# Patient Record
Sex: FEMALE | Race: WHITE | URBAN - METROPOLITAN AREA
[De-identification: names, ages, dates, MRNs, and addresses within clinical notes are randomized per-mention and may not be internally consistent; named-entity substitution may affect disease eponyms.]

---

## 2018-05-17 ENCOUNTER — HOSPITAL ENCOUNTER (OUTPATIENT)
Dept: OTHER | Age: 78
Discharge: HOME | End: 2018-05-31
Attending: NEUROLOGICAL SURGERY | Admitting: NEUROLOGICAL SURGERY

## 2018-05-17 ASSESSMENT — PAIN SCALES - QUEBEC BACK PAIN DISABILITY SCALE
SLEEP THROUGH THE NIGHT: 5
MAKE YOUR BED: 1
PUT ON SOCKS OR PANYHOSE: 1
LIFT AND CARRY A HEAVY SUITCASE: 5
WALK A FEW BLOCKS OR 300 TO 400M: 5
SIT IN A CHAIR FOR SEVERAL HOURS: 4
TOTAL SCORE: 60
QUEBEC CMS MODIFIER: CL
TURN OVER IN BED: 2
STAND UP FOR 20 TO 30 MINUTES: 4
CLIMB ONE FLIGHT OF STAIRS: 5
MOVE A CHAIR: 1
THROW A BALL: 0
GET OUT OF BED: 3
REACH UP TO HIGH SHELVES: 3
RUN ONE BLOCK OR 100M: 5
CARRY TWO BAGS OF GROCERIES: 3
TAKE FOOD OUT OF THE REFRIGERATOR: 0
RIDE IN A CAR: 2
BEND OVER TO CLEAN THE BATHTUB: 4
PULL OR PUSH HEAVY DOORS: 2
QUEBEC DISABILITY INDEX: 60-79%
WALK SEVERAL KILOMETERS  OR MILES: 5

## 2018-05-22 ENCOUNTER — HOSPITAL ENCOUNTER (OUTPATIENT)
Dept: PHYSICAL THERAPY | Age: 78
Discharge: HOME | End: 2018-05-23
Admitting: NEUROLOGICAL SURGERY

## 2018-05-23 NOTE — FLOWSHEET NOTE
Physical Therapy Aquatic Flow Sheet   Date:  2018    Patient Name:  Carly Steel    :  1940  Restrictions/Precautions: Position Activity Restriction  Other position/activity restrictions: low fall risk     Pertinent Medical History: Additional Pertinent Hx: pacemaker/defibrillator, CAD: at evaluation: pt was lying flat and suddenly c/o \"fading\" and feeling like she was going to lose consciouness.  Pt sat and it resolved and then happened again when tranferring to the chair.  Pt's pulse is steady from pacemaker, and BP was 110/58.  She reports this has happened in the past with lying flat on the floor.  She is due to see her cardiologist for recent concerns with LE edema and possible new pacemaker. Misael Medina is going to mention  these episodes at that time.       Medical/Treatment Diagnosis Information:  · Diagnosis: lumbar spondylolisthesis, stenosis, back pain with radiculopathy  · Treatment Diagnosis: back and LE pain; dec ROM; dec strength and endurance      Insurance/Certification information:  PT Insurance Information: Medicare  Physician Information:  Referring Practitioner: Dr. Dejesus Border of care signed (Y/N): Faxed at eval     Visit# / total visits:    Pain level: 4/10       G-Code (if applicable):      Date G-Code Applied:    PT G-Codes  Functional Assessment Tool Used: [place]  Score: 60 ( 60-79%)  Functional Limitation: Changing and maintaining body position  Changing and Maintaining Body Position Current Status (): At least 60 percent but less than 80 percent impaired, limited or restricted  Changing and Maintaining Body Position Goal Status ():  At least 40 percent but less than 60 percent impaired, limited or restricted     Progress Note: []  Yes  [x]  No  Next due by: Visit #10:      Subjective:  Having a good day for her    Objective:  Observation:  See eval  Test measurements:      Key  B= Belt DB= Dumbells T= Theratube   G=Gloves N= Noodles W= Weights   P=

## 2018-06-01 ENCOUNTER — HOSPITAL ENCOUNTER (OUTPATIENT)
Dept: OTHER | Age: 78
Discharge: HOME | End: 2018-06-30
Attending: NEUROLOGICAL SURGERY | Admitting: NEUROLOGICAL SURGERY

## 2018-06-07 ENCOUNTER — HOSPITAL ENCOUNTER (OUTPATIENT)
Dept: PHYSICAL THERAPY | Age: 78
Discharge: HOME | End: 2018-06-08
Admitting: NEUROLOGICAL SURGERY

## 2018-06-07 NOTE — FLOWSHEET NOTE
Belt DB= Dumbells T= Theratube   G=Gloves N= Noodles W= Weights   P= Paddles WW=Water Walker K= Kickboard        Transfers:  stairs      % Immersion: 75%            Ambulation:  laps ind Exercises UE:      Forwards 2 Shoulder Shrugs     Lateral 1 Shoulder circles     Marching    Scapular Retraction      Retro   Rolling      Cariocas  Push Downs      IR/ER      Punching    Stretching:  Rowing    Gastroc/Soleus   Elbow Flex/Ext    Hamstring   Shldr Flex/Ext     Knee flex stretch   Shldr Abd/Add    Piriformis   Horiz Abd/Add     Hip flexor    Arm Circles     SKTC   PNF Diagonals    DKTC  UE oscillations f/b, s/s    ITB   Wall Push-ups    Quad  Figure 8's    Mid back   Buoy pull/push downs    UT  Tband rows    Rhom  Tband lats    Post Shoulder  Lumbar Rot w/ paddles    Pec   Small ball pull downs                   diagonals             Cervical:       AROM Flex       AROM Extension     LEs exercises:   AROM Side Bending    Marching 10x AROM Rotation    Heel Raises 10x Chin tucks    Toe Raises 10x Chin nods     Squats 10x      Hamstring Curls 10x      Hip Flexion 10x Balance:      Hip Abduction 10x SLS    Hip Circles 10x Tandem stance    TA set 10x NBOS eyes open    Glut Set 10x NBOS eyes closed    Hip Extension  Hand to Opposite Knee    Hip Adduction   Box Step     Hip IR  Noodle Stance     Hip ER  Stop/Go Gait    Fig 8's  Switch Gait                Seated:  Functional:    Ankle Pumps 10x Step up forward    Ankle circles 10 Step up lateral    Knee flex & ext 10x Step down    Hip Abd & Adduction 10x Shallow Water squats    Bicycle  10x Crate Lifts    Add Set with ball 10x Lunges forward    LX stab with med ball throws  Lunges lateral    Ankle INV  Lunges retro    Ankle EV  Lower ab curl with noodle      Upper ab curl with ball      Med ball straight lifts      Med ball diagonal lifts      Hydrorider          Noodle:      Leg Press  Deep Water:    Noodle hang at wall  Jog    Noodle hang deep water  Jumping [employer]

## 2018-06-12 ENCOUNTER — HOSPITAL ENCOUNTER (OUTPATIENT)
Dept: PHYSICAL THERAPY | Age: 78
Discharge: HOME | End: 2018-06-13
Admitting: NEUROLOGICAL SURGERY

## 2018-06-12 NOTE — FLOWSHEET NOTE
LX stab with med ball throws  Lunges lateral    Ankle INV  Lunges retro    Ankle EV  Lower ab curl with noodle      Upper ab curl with ball      Med ball straight lifts      Med ball diagonal lifts      Hydrorider          Noodle:      Leg Press  Deep Water:    Noodle hang at wall  Jog    Noodle hang deep water  Jumping Jacks    Noodle Bicycle  Heel to toe      Hand to opposite knee      Cross country skier      Rocking Horse        Timed Code Treatment Minutes:  45  Total Treatment Minutes:  45    Treatment/Activity Tolerance:  [x] Patient tolerated treatment well [] Patient limited by fatique  [] Patient limited by pain  [] Patient limited by other medical complications  [x] Other:   6/13: discussed at length with patient diagnosis and potential causes of LE pain. Also discussed with pt possible neuropathy as a second source of foot pain. Patient instructed to talk with PCP or spine MD about possible neuropathy if symptoms don't improve with PT. Patient notified she can contact MD sooner about B foot pain if it continues to be miserable. Patient verbalized understanding. Prognosis: [x] Good [] Fair  [] Poor    Patient Requires Follow-up: [x] Yes  [] No    Plan:   [x] Continue per plan of care [] Alter current plan (see comments)  [] Plan of care initiated [] Hold pending MD visit [] Discharge    Plan for Next Session: increase gait.   Add long lever UEs and [facility].     Electronically signed by: Mick Scott, [id number] Jigar Gaffney

## 2018-06-14 ENCOUNTER — HOSPITAL ENCOUNTER (OUTPATIENT)
Dept: PHYSICAL THERAPY | Age: 78
Discharge: HOME | End: 2018-06-15
Admitting: NEUROLOGICAL SURGERY

## 2018-06-19 ENCOUNTER — HOSPITAL ENCOUNTER (OUTPATIENT)
Dept: PHYSICAL THERAPY | Age: 78
Discharge: HOME | End: 2018-06-20
Admitting: NEUROLOGICAL SURGERY

## 2018-06-19 NOTE — FLOWSHEET NOTE
Physical Therapy Aquatic Flow Sheet   Date:  2018    Patient Name:  Pretty Sahni    :  1940  Restrictions/Precautions: Position Activity Restriction  Other position/activity restrictions: low fall risk     Pertinent Medical History: Additional Pertinent Hx: pacemaker/defibrillator, CAD: at evaluation: pt was lying flat and suddenly c/o \"fading\" and feeling like she was going to lose consciouness.  Pt sat and it resolved and then happened again when tranferring to the chair.  Pt's pulse is steady from pacemaker, and BP was 110/58.  She reports this has happened in the past with lying flat on the floor.  She is due to see her cardiologist for recent concerns with LE edema and possible new pacemaker. Lana Walter is going to mention  these episodes at that time.       Medical/Treatment Diagnosis Information:  · Diagnosis: lumbar spondylolisthesis, stenosis, back pain with radiculopathy  · Treatment Diagnosis: back and LE pain; dec ROM; dec strength and endurance      Insurance/Certification information:  PT Insurance Information: Medicare  Physician Information:  Referring Practitioner: Dr. Nick Thomas of care signed (Y/N): Faxed at eval     Visit# / total visits:    Pain level: 2/10       G-Code (if applicable):      Date G-Code Applied:    PT G-Codes  Functional Assessment Tool Used: [place]  Score: 60 ( 60-79%)  Functional Limitation: Changing and maintaining body position  Changing and Maintaining Body Position Current Status (): At least 60 percent but less than 80 percent impaired, limited or restricted  Changing and Maintaining Body Position Goal Status (): At least 40 percent but less than 60 percent impaired, limited or restricted     Progress Note: []  Yes  [x]  No  Next due by: Visit #10:      Subjective:  No pain currently. States she still wakes at night due to B foot pain but has improved.      Objective:  Observation:  See eval  Test measurements:      Key  B= Belt DB= Dumbells T= Theratube   G=Gloves N= Noodles W= Weights   P= Paddles WW=Water Walker K= Kickboard        Transfers:  stairs      % Immersion: 75%            Ambulation:  laps ind Exercises UE:      Forwards 4 Shoulder Shrugs 10x    Lateral 2 Shoulder circles     Marching    Scapular Retraction 10x    Retro   Rolling 10x     Cariocas  Push Downs 10x     IR/ER 10x     Punching 10x   Stretching: bilateral Rowing 10x   Gastroc/Soleus 3x20 sec  Elbow Flex/Ext 10x   Hamstring   Shldr Flex/Ext 10x    Knee flex stretch   Shldr Abd/Add 10x   Piriformis   Horiz Abd/Add 10x    Hip flexor    Arm Circles 10x    SKTC   PNF Diagonals 10x   DKTC x1 min - PRN throughout treatment session UE oscillations f/b, s/s    ITB   Wall Push-ups    Quad  Figure 8's    Mid back   Buoy pull/push downs    UT  Tband rows    Rhom  Tband lats    Post Shoulder  Lumbar Rot w/ paddles    Pec   Small ball pull downs                   diagonals             Cervical:       AROM Flex       AROM Extension     LEs exercises:   AROM Side Bending    Marching 10x AROM Rotation    Heel Raises 10x Chin tucks    Toe Raises 10x Chin nods     Squats 10x      Hamstring Curls 10x      Hip Flexion 10x Balance:      Hip Abduction 10x SLS    Hip Circles 10x Tandem stance x30 sec L/R   TA set 10x NBOS eyes open    Glut Set 10x NBOS eyes closed    Hip Extension  Hand to Opposite Knee    Hip Adduction   Box Step     Hip IR  Noodle Stance     Hip ER  Stop/Go Gait    Fig 8's  Switch Gait                Seated:  Functional:    Ankle Pumps  held due to cramping in feet when performing Step up forward    Ankle circles  Step up lateral    Knee flex & ext 10x Step down    Hip Abd & Adduction 10x Shallow Water squats    Bicycle  10x Crate Lifts    Add Set with ball 10x Lunges forward    LX stab with med ball throws  Lunges lateral    Ankle INV  Lunges retro    Ankle EV  Lower ab curl with noodle      Upper ab curl with ball      Med ball straight lifts      Med ball diagonal

## 2018-06-21 ENCOUNTER — HOSPITAL ENCOUNTER (OUTPATIENT)
Dept: PHYSICAL THERAPY | Age: 78
Discharge: HOME | End: 2018-06-22
Admitting: NEUROLOGICAL SURGERY

## 2018-06-21 NOTE — FLOWSHEET NOTE
Physical Therapy Aquatic Flow Sheet   Date:  2018    Patient Name:  Darius Vasquez    :  1940  Restrictions/Precautions: Position Activity Restriction  Other position/activity restrictions: low fall risk     Pertinent Medical History: Additional Pertinent Hx: pacemaker/defibrillator, CAD: at evaluation: pt was lying flat and suddenly c/o \"fading\" and feeling like she was going to lose consciouness.  Pt sat and it resolved and then happened again when tranferring to the chair.  Pt's pulse is steady from pacemaker, and BP was 110/58.  She reports this has happened in the past with lying flat on the floor.  She is due to see her cardiologist for recent concerns with LE edema and possible new pacemaker. Chasity Rosario is going to mention  these episodes at that time.       Medical/Treatment Diagnosis Information:  · Diagnosis: lumbar spondylolisthesis, stenosis, back pain with radiculopathy  · Treatment Diagnosis: back and LE pain; dec ROM; dec strength and endurance      Insurance/Certification information:  PT Insurance Information: Medicare  Physician Information:  Referring Practitioner: Dr. Alexis Holder of care signed (Y/N): Faxed at eval     Visit# / total visits:    Pain level: 3/10       G-Code (if applicable):      Date G-Code Applied:    PT G-Codes  Functional Assessment Tool Used: [place]  Score: 60 ( 60-79%)  Functional Limitation: Changing and maintaining body position  Changing and Maintaining Body Position Current Status (): At least 60 percent but less than 80 percent impaired, limited or restricted  Changing and Maintaining Body Position Goal Status (): At least 40 percent but less than 60 percent impaired, limited or restricted     Progress Note: []  Yes  [x]  No  Next due by: Visit #10:      Subjective:  Patient states she continues to have good days and bad days. Notes her severe pain is no longer constant since starting PT. Patient reports 50% improvement overall. However does continue to complain of severe B foot pain especially at night which keeps her awake. Last night she was awake for 1.5 hours due to pain. Has been limiting how long she is up on her feet at one time which seems to help. Objective:  Observation:  See eval  Test measurements:      Key  B= Belt DB= Dumbells T= Theratube   G=Gloves N= Noodles W= Weights   P= Paddles WW=Water Walker K= Kickboard        Transfers:  stairs      % Immersion: 75%            Ambulation:  laps ind Exercises UE:      Forwards 4 Shoulder Shrugs 10x    Lateral 2 Shoulder circles     Marching    Scapular Retraction 10x    Retro   Rolling 10x     Cariocas  Push Downs 10x     IR/ER 10x     Punching 10x   Stretching: bilateral Rowing 10x   Gastroc/Soleus 3x20 sec  Elbow Flex/Ext 10x   Hamstring   Shldr Flex/Ext 10x    Knee flex stretch   Shldr Abd/Add 10x   Piriformis   Horiz Abd/Add 10x    Hip flexor    Arm Circles 10x    SKTC   PNF Diagonals 10x   DKTC x1 min - PRN throughout treatment session UE oscillations f/b, s/s    ITB   Wall Push-ups    Quad  Figure 8's    Mid back   Buoy pull/push downs    UT  Tband rows    Rhom  Tband lats    Post Shoulder  Lumbar Rot w/ paddles    Pec   Small ball pull downs                   diagonals             Cervical:       AROM Flex       AROM Extension     LEs exercises:   AROM Side Bending    Marching 10x AROM Rotation    Heel Raises 10x Chin tucks    Toe Raises 10x Chin nods     Squats 10x      Hamstring Curls 10x      Hip Flexion 10x Balance:      Hip Abduction 10x SLS    Hip Circles 10x Tandem stance x30 sec L/R   TA set 10x NBOS eyes open    Glut Set 10x NBOS eyes closed    Hip Extension  Hand to Opposite Knee    Hip Adduction   Box Step     Hip IR  Noodle Stance     Hip ER  Stop/Go Gait    Fig 8's  Switch Gait                Seated:  Functional:    Ankle Pumps  held due to cramping in feet when performing Step up forward 10x   Ankle circles  Step up lateral    Knee flex & ext 15x Step down Hip Abd & Adduction 15x Shallow Water squats    Bicycle  15x Crate Lifts    Add Set with ball 10x Lunges forward    LX stab with med ball throws  Lunges lateral    Ankle INV  Lunges retro    Ankle EV  Lower ab curl with noodle      Upper ab curl with ball      Med ball straight lifts      Med ball diagonal lifts      Hydrorider          Noodle:      Leg Press Small 10x L/R Deep Water:    Noodle hang at wall  held due to increased foot pain last session Jog    Noodle hang deep water  Jumping Jacks    Noodle Bicycle  Heel to toe      Hand to opposite knee      Cross country skier      Rocking Horse        Timed Code Treatment Minutes:  60  Total Treatment Minutes:  60    Treatment/Activity Tolerance:  [x] Patient tolerated treatment well [] Patient limited by fatique  [] Patient limited by pain  [] Patient limited by other medical complications  [] Other:      Prognosis: [x] Good [] Fair  [] Poor    Patient Requires Follow-up: [x] Yes  [] No    Plan:   [x] Continue per plan of care [] Alter current plan (see comments)  [] Plan of care initiated [] Hold pending MD visit [] Discharge    Plan for Next Session: add lateral step ups    Electronically signed by: Macy Hercules, [id number] Jigar Gaffney

## 2018-06-26 ENCOUNTER — HOSPITAL ENCOUNTER (OUTPATIENT)
Dept: PHYSICAL THERAPY | Age: 78
Discharge: HOME | End: 2018-06-27
Admitting: NEUROLOGICAL SURGERY

## 2018-06-26 NOTE — FLOWSHEET NOTE
Physical Therapy Aquatic Flow Sheet   Date:  2018    Patient Name:  Mateo Key    :  1940  Restrictions/Precautions: Position Activity Restriction  Other position/activity restrictions: low fall risk     Pertinent Medical History: Additional Pertinent Hx: pacemaker/defibrillator, CAD: at evaluation: pt was lying flat and suddenly c/o \"fading\" and feeling like she was going to lose consciouness.  Pt sat and it resolved and then happened again when tranferring to the chair.  Pt's pulse is steady from pacemaker, and BP was 110/58.  She reports this has happened in the past with lying flat on the floor.  She is due to see her cardiologist for recent concerns with LE edema and possible new pacemaker. Keely Cohen is going to mention  these episodes at that time.       Medical/Treatment Diagnosis Information:  · Diagnosis: lumbar spondylolisthesis, stenosis, back pain with radiculopathy  · Treatment Diagnosis: back and LE pain; dec ROM; dec strength and endurance      Insurance/Certification information:  PT Insurance Information: Medicare  Physician Information:  Referring Practitioner: Dr. Sissy Rodríguez of care signed (Y/N): Faxed at eval     Visit# / total visits:    Pain level: 2/10       G-Code (if applicable):      Date G-Code Applied:    PT G-Codes  Functional Assessment Tool Used: [place]  Score: 60 ( 60-79%)  Functional Limitation: Changing and maintaining body position  Changing and Maintaining Body Position Current Status (): At least 60 percent but less than 80 percent impaired, limited or restricted  Changing and Maintaining Body Position Goal Status (): At least 40 percent but less than 60 percent impaired, limited or restricted     Progress Note: []  Yes  [x]  No  Next due by: Visit #10:      Subjective:  Feels aquatic ex helpful.      Objective:  Observation:  See eval  Test measurements:      Key  B= Belt DB= Dumbells T= Theratube   G=Gloves N= Noodles W= Weights   P= Paddles WW=Water Walker K= Kickboard        Transfers:  stairs      % Immersion: 75%            Ambulation:  laps ind Exercises UE:      Forwards 4 Shoulder Shrugs 10x    Lateral 2 Shoulder circles     Marching    Scapular Retraction 10x    Retro   Rolling 10x     Cariocas  Push Downs 10x     IR/ER 10x     Punching 10x   Stretching: bilateral Rowing 10x   Gastroc/Soleus 3x20 sec  Elbow Flex/Ext 10x   Hamstring  3 x 20 sec Shldr Flex/Ext 10x    Knee flex stretch   Shldr Abd/Add 10x   Piriformis   Horiz Abd/Add 10x    Hip flexor    Arm Circles 10x    SKTC   PNF Diagonals 10x   DKTC x1 min - PRN throughout treatment session UE oscillations f/b, s/s    ITB   Wall Push-ups    Quad  Figure 8's    Mid back   Buoy pull/push downs    UT  Tband rows    Rhom  Tband lats    Post Shoulder  Lumbar Rot w/ paddles    Pec   Small ball pull downs                   diagonals             Cervical:       AROM Flex       AROM Extension     LEs exercises:   AROM Side Bending    Marching 10x AROM Rotation    Heel Raises 10x Chin tucks    Toe Raises 10x Chin nods     Squats 10x      Hamstring Curls 10x      Hip Flexion 10x Balance:      Hip Abduction 10x SLS    Hip Circles 10x Tandem stance x30 sec L/R   TA set 10x NBOS eyes open    Glut Set 10x NBOS eyes closed    Hip Extension  Hand to Opposite Knee    Hip Adduction   Box Step     Hip IR  Noodle Stance     Hip ER  Stop/Go Gait    Fig 8's  Switch Gait                Seated:  Functional:    Ankle Pumps  held due to cramping in feet when performing Step up forward 10x   Ankle circles  Step up lateral 10x   Knee flex & ext 15x Step down    Hip Abd & Adduction 15x Shallow Water squats    Bicycle  15x Crate Lifts    Add Set with ball 10x Lunges forward    LX stab with med ball throws  Lunges lateral    Ankle INV  Lunges retro    Ankle EV  Lower ab curl with noodle      Upper ab curl with ball      Med ball straight lifts      Med ball diagonal lifts      Hydrorider          Noodle:      Leg Press Small 10x L/R Deep Water:    Noodle hang at wall  held due to increased foot pain in past Jog    Noodle hang deep water  Jumping Jacks    Noodle Bicycle  Heel to toe      Hand to opposite knee      Cross country skier      Rocking Horse        Timed Code Treatment Minutes:  60  Total Treatment Minutes:  60    Treatment/Activity Tolerance:  [x] Patient tolerated treatment well [] Patient limited by fatique  [] Patient limited by pain  [] Patient limited by other medical complications  [x] Other:  C/o of increased B foot pain after seated ex. Needs cues to stay focused and perform ex correctly    Prognosis: [x] Good [] Fair  [] Poor    Patient Requires Follow-up: [x] Yes  [] No    Plan:   [x] Continue per plan of care [] Alter current plan (see comments)  [] Plan of care initiated [] Hold pending MD visit [] Discharge    Plan for Next Session: hand to opposite knee. Cont aquatics 2x/week x 4 weeks.      Electronically signed by: Marva Han,

## 2018-06-28 ENCOUNTER — HOSPITAL ENCOUNTER (OUTPATIENT)
Dept: PHYSICAL THERAPY | Age: 78
Discharge: HOME | End: 2018-06-29
Admitting: NEUROLOGICAL SURGERY

## 2018-06-28 NOTE — FLOWSHEET NOTE
ab curl with ball      Med ball straight lifts      Med ball diagonal lifts      Hydrorider          Noodle:      Leg Press Small 10x L/R Deep Water:    Noodle hang at wall  held due to increased foot pain in past Jog    Noodle hang deep water  Jumping Jacks    Noodle Bicycle  Heel to toe      Hand to opposite knee      Cross country skier      Rocking Horse        Timed Code Treatment Minutes:  50  Total Treatment Minutes:  50    Treatment/Activity Tolerance:  [x] Patient tolerated treatment well [] Patient limited by fatique  [] Patient limited by pain  [] Patient limited by other medical complications  [x] Other:  Offered patient more hang time in between ex today. Cues for proper ex tech . Pain 2/10 after ex    Prognosis: [x] Good [] Fair  [] Poor    Patient Requires Follow-up: [x] Yes  [] No    Plan:   [x] Continue per plan of care [] Alter current plan (see comments)  [] Plan of care initiated [] Hold pending MD visit [] Discharge    Plan for Next Session: hand to opposite knee.     Electronically signed by: George Cervantes,

## 2018-07-01 ENCOUNTER — HOSPITAL ENCOUNTER (OUTPATIENT)
Dept: OTHER | Age: 78
Discharge: HOME | End: 2018-07-20
Attending: NEUROLOGICAL SURGERY | Admitting: NEUROLOGICAL SURGERY

## 2018-07-03 ENCOUNTER — HOSPITAL ENCOUNTER (OUTPATIENT)
Dept: PHYSICAL THERAPY | Age: 78
Discharge: HOME | End: 2018-07-04
Admitting: NEUROLOGICAL SURGERY

## 2018-07-03 ASSESSMENT — PAIN SCALES - QUEBEC BACK PAIN DISABILITY SCALE
BEND OVER TO CLEAN THE BATHTUB: 4
QUEBEC CMS MODIFIER: CK
WALK SEVERAL KILOMETERS  OR MILES: 5
TAKE FOOD OUT OF THE REFRIGERATOR: 0
RUN ONE BLOCK OR 100M: 5
WALK A FEW BLOCKS OR 300 TO 400M: 5
QUEBEC DISABILITY INDEX: 40-59%
TURN OVER IN BED: 2
MOVE A CHAIR: 1
PUT ON SOCKS OR PANYHOSE: 1
TOTAL SCORE: 57
PULL OR PUSH HEAVY DOORS: 2
SIT IN A CHAIR FOR SEVERAL HOURS: 4
REACH UP TO HIGH SHELVES: 3
SLEEP THROUGH THE NIGHT: 5
STAND UP FOR 20 TO 30 MINUTES: 2
GET OUT OF BED: 3
CLIMB ONE FLIGHT OF STAIRS: 4
THROW A BALL: 0
MAKE YOUR BED: 1
RIDE IN A CAR: 2
LIFT AND CARRY A HEAVY SUITCASE: 5
CARRY TWO BAGS OF GROCERIES: 3

## 2018-07-03 NOTE — FLOWSHEET NOTE
Physical Therapy Aquatic Flow Sheet   Date:  7/3/2018    Patient Name:  Jaqueline Miller    :  1940  Restrictions/Precautions: Position Activity Restriction  Other position/activity restrictions: low fall risk     Pertinent Medical History: Additional Pertinent Hx: pacemaker/defibrillator, CAD: at evaluation: pt was lying flat and suddenly c/o \"fading\" and feeling like she was going to lose consciouness.  Pt sat and it resolved and then happened again when tranferring to the chair.  Pt's pulse is steady from pacemaker, and BP was 110/58.  She reports this has happened in the past with lying flat on the floor.  She is due to see her cardiologist for recent concerns with LE edema and possible new pacemaker. Denny Langfordanna is going to mention  these episodes at that time.       Medical/Treatment Diagnosis Information:  · Diagnosis: lumbar spondylolisthesis, stenosis, back pain with radiculopathy  · Treatment Diagnosis: back and LE pain; dec ROM; dec strength and endurance      Insurance/Certification information:  PT Insurance Information: Medicare  Physician Information:  Referring Practitioner: Dr. Kana Marrero of care signed (Y/N): Faxed at eval     Visit# / total visits:  10/ 20  Pain level: 4.5/10       G-Code (if applicable):      Date G-Code Applied:    PT G-Codes  Functional Assessment Tool Used: [place]  Score: 60 ( 60-79%)  Functional Limitation: Changing and maintaining body position  Changing and Maintaining Body Position Current Status (): At least 60 percent but less than 80 percent impaired, limited or restricted  Changing and Maintaining Body Position Goal Status (): At least 40 percent but less than 60 percent impaired, limited or restricted     Progress Note: []  Yes  [x]  No  Next due by: Visit #10:      Subjective:  Reports still does not feel as good as she did 1 week ago. . Feels tandem stance aggravates. Has central LBP / L LE to foot and R foot. curl with ball      Med ball straight lifts      Med ball diagonal lifts      Hydrorider          Noodle:      Leg Press  Deep Water:    Noodle hang at wall  held due to increased foot pain in past Jog    Noodle hang deep water  Jumping Jacks    Noodle Bicycle  Heel to toe      Hand to opposite knee      Cross country skier      Rocking Horse        Timed Code Treatment Minutes:   decreased time 35 min due to flare up  Total Treatment Minutes:   35    Treatment/Activity Tolerance:  [x] Patient tolerated treatment well [] Patient limited by fatique  [] Patient limited by pain  [] Patient limited by other medical complications  [x] Other:  Offered patient more hang time in between ex today. Cues for proper ex tech . Pain  Same 4.5/10 after ex. Cues to count reps of ex. Prognosis: [x] Good [] Fair  [] Poor    Patient Requires Follow-up: [x] Yes  [] No    Plan:   [x] Continue per plan of care [] Alter current plan (see comments)  [] Plan of care initiated [] Hold pending MD visit [] Discharge    Plan for Next Session:  Assess  And resume as tolerated.      Electronically signed by: Sadia Quarles,

## 2018-07-05 ENCOUNTER — HOSPITAL ENCOUNTER (OUTPATIENT)
Dept: PHYSICAL THERAPY | Age: 78
Discharge: HOME | End: 2018-07-06
Admitting: NEUROLOGICAL SURGERY

## 2018-07-10 ENCOUNTER — HOSPITAL ENCOUNTER (OUTPATIENT)
Dept: PHYSICAL THERAPY | Age: 78
Discharge: HOME | End: 2018-07-11
Admitting: NEUROLOGICAL SURGERY

## 2018-07-12 ENCOUNTER — HOSPITAL ENCOUNTER (OUTPATIENT)
Dept: PHYSICAL THERAPY | Age: 78
Discharge: HOME | End: 2018-07-13
Admitting: NEUROLOGICAL SURGERY

## 2018-07-17 NOTE — DISCHARGE SUMMARY
Other:  [] Manual Therapy       []    [x] Aquatic Therapy       []                    ? Significant Findings At Last Visit/Comments:     * Pt with recent injury to foot with open wound. All of her visits were cancelled to wait for healing of her foot. Pt and PT decided to d/c at this time as pt is close to finishing appt anyway and is ready to d/c to independent hep. Progress Note (6/26/18)  * Pt rayshawn 60 min pool exercises  * pt reports improvements with PT overall; less pain intensity noted and pt reports inc in activity level also  * overall 10% improvement   * pt requesting not to lie supine for bridge testing  * L LE hip flex 4+/5, QD 5/5, HS 4+/5; R LE hip flex 4+/5, QD 5/5, HS 4+/5        Medicare total: $ 856.61     Assessment:  Summary:  Slow progression due to other jv concerns and delayed start to PT  Patient's response to treatment: good    Progress towards goals:  Progressing slowly     Current Frequency/Duration:  # Days per week: [] 1 day # Weeks: [] 1 week [x] 4 weeks      [x] 2 days? [] 2 weeks [] 5 weeks      [] 3 days   [] 3 weeks [] 6 weeks     Rehab Potential: [] Excellent [x] Good [] Fair  [] Poor     Goal Status:  [x] Achieved [x] Partially Achieved  [] Not Achieved     Patient Status: [] Continue per initial plan of Care     [x] Patient now discharged     [] Additional visits requested, Please re-certify for additional visits with Aquatic PT:          Electronically signed by:  Miguel Allan PT  [id number]  If you have any questions or concerns, please don't hesitate to call.   Thank you for your referral.    Physician Signature:________________________________Date:__________________  By signing above, therapists plan is approved by physician

## 2023-02-15 ENCOUNTER — ANESTHESIA EVENT (OUTPATIENT)
Dept: ENDOSCOPY | Age: 83
End: 2023-02-15
Payer: COMMERCIAL

## 2023-02-16 ENCOUNTER — ANESTHESIA (OUTPATIENT)
Dept: ENDOSCOPY | Age: 83
End: 2023-02-16
Payer: COMMERCIAL

## 2023-02-16 ENCOUNTER — HOSPITAL ENCOUNTER (OUTPATIENT)
Age: 83
Setting detail: OUTPATIENT SURGERY
Discharge: HOME | End: 2023-02-16
Attending: INTERNAL MEDICINE | Admitting: INTERNAL MEDICINE
Payer: COMMERCIAL

## 2023-02-16 VITALS
HEART RATE: 71 BPM | HEIGHT: 63 IN | SYSTOLIC BLOOD PRESSURE: 127 MMHG | DIASTOLIC BLOOD PRESSURE: 67 MMHG | WEIGHT: 201 LBS | RESPIRATION RATE: 14 BRPM | TEMPERATURE: 97.6 F | OXYGEN SATURATION: 99 % | BODY MASS INDEX: 35.61 KG/M2

## 2023-02-16 DIAGNOSIS — R10.84 GENERALIZED ABDOMINAL PAIN: ICD-10-CM

## 2023-02-16 DIAGNOSIS — K21.9 GASTROESOPHAGEAL REFLUX DISEASE, UNSPECIFIED WHETHER ESOPHAGITIS PRESENT: ICD-10-CM

## 2023-02-16 PROCEDURE — 7100000011 HC PHASE II RECOVERY - ADDTL 15 MIN: Performed by: INTERNAL MEDICINE

## 2023-02-16 PROCEDURE — 3609010600 HC COLONOSCOPY POLYPECTOMY SNARE/COLD BIOPSY: Performed by: INTERNAL MEDICINE

## 2023-02-16 PROCEDURE — 7100000010 HC PHASE II RECOVERY - FIRST 15 MIN: Performed by: INTERNAL MEDICINE

## 2023-02-16 PROCEDURE — 3609012400 HC EGD TRANSORAL BIOPSY SINGLE/MULTIPLE: Performed by: INTERNAL MEDICINE

## 2023-02-16 PROCEDURE — 88305 TISSUE EXAM BY PATHOLOGIST: CPT

## 2023-02-16 PROCEDURE — 3700000000 HC ANESTHESIA ATTENDED CARE: Performed by: INTERNAL MEDICINE

## 2023-02-16 PROCEDURE — 2709999900 HC NON-CHARGEABLE SUPPLY: Performed by: INTERNAL MEDICINE

## 2023-02-16 PROCEDURE — 3700000001 HC ADD 15 MINUTES (ANESTHESIA): Performed by: INTERNAL MEDICINE

## 2023-02-16 PROCEDURE — 2580000003 HC RX 258: Performed by: ANESTHESIOLOGY

## 2023-02-16 PROCEDURE — 6360000002 HC RX W HCPCS: Performed by: NURSE ANESTHETIST, CERTIFIED REGISTERED

## 2023-02-16 RX ORDER — FUROSEMIDE 20 MG/1
TABLET ORAL
COMMUNITY
Start: 2022-12-30

## 2023-02-16 RX ORDER — ALBUTEROL SULFATE 90 UG/1
AEROSOL, METERED RESPIRATORY (INHALATION)
COMMUNITY
Start: 2023-01-23

## 2023-02-16 RX ORDER — DILTIAZEM HYDROCHLORIDE 120 MG/1
CAPSULE, COATED, EXTENDED RELEASE ORAL
COMMUNITY
Start: 2023-01-27

## 2023-02-16 RX ORDER — POTASSIUM CHLORIDE 1500 MG/1
TABLET, EXTENDED RELEASE ORAL
COMMUNITY
Start: 2022-12-30

## 2023-02-16 RX ORDER — HYDROCHLOROTHIAZIDE 12.5 MG/1
CAPSULE, GELATIN COATED ORAL
COMMUNITY
Start: 2022-12-02

## 2023-02-16 RX ORDER — PANTOPRAZOLE SODIUM 40 MG/1
TABLET, DELAYED RELEASE ORAL
COMMUNITY
Start: 2018-04-23

## 2023-02-16 RX ORDER — FLUTICASONE PROPIONATE 50 MCG
2 SPRAY, SUSPENSION (ML) NASAL DAILY
COMMUNITY
Start: 2022-10-07

## 2023-02-16 RX ORDER — LIDOCAINE HYDROCHLORIDE 20 MG/ML
INJECTION, SOLUTION INTRAVENOUS PRN
Status: DISCONTINUED | OUTPATIENT
Start: 2023-02-16 | End: 2023-02-16 | Stop reason: SDUPTHER

## 2023-02-16 RX ORDER — SODIUM CHLORIDE, SODIUM LACTATE, POTASSIUM CHLORIDE, CALCIUM CHLORIDE 600; 310; 30; 20 MG/100ML; MG/100ML; MG/100ML; MG/100ML
INJECTION, SOLUTION INTRAVENOUS CONTINUOUS
Status: DISCONTINUED | OUTPATIENT
Start: 2023-02-16 | End: 2023-02-16 | Stop reason: HOSPADM

## 2023-02-16 RX ORDER — PROPOFOL 10 MG/ML
INJECTION, EMULSION INTRAVENOUS PRN
Status: DISCONTINUED | OUTPATIENT
Start: 2023-02-16 | End: 2023-02-16 | Stop reason: SDUPTHER

## 2023-02-16 RX ORDER — ASPIRIN 81 MG/1
81 TABLET, CHEWABLE ORAL DAILY
COMMUNITY

## 2023-02-16 RX ADMIN — LIDOCAINE HYDROCHLORIDE 100 MG: 20 INJECTION, SOLUTION INTRAVENOUS at 08:26

## 2023-02-16 RX ADMIN — PROPOFOL 180 MCG/KG/MIN: 10 INJECTION, EMULSION INTRAVENOUS at 08:26

## 2023-02-16 RX ADMIN — SODIUM CHLORIDE, POTASSIUM CHLORIDE, SODIUM LACTATE AND CALCIUM CHLORIDE: 600; 310; 30; 20 INJECTION, SOLUTION INTRAVENOUS at 08:12

## 2023-02-16 RX ADMIN — PROPOFOL 20 MG: 10 INJECTION, EMULSION INTRAVENOUS at 08:32

## 2023-02-16 RX ADMIN — PROPOFOL 20 MG: 10 INJECTION, EMULSION INTRAVENOUS at 08:29

## 2023-02-16 ASSESSMENT — PAIN DESCRIPTION - DESCRIPTORS: DESCRIPTORS: BURNING;GNAWING;DISCOMFORT

## 2023-02-16 ASSESSMENT — PAIN SCALES - GENERAL: PAINLEVEL_OUTOF10: 0

## 2023-02-16 ASSESSMENT — PAIN - FUNCTIONAL ASSESSMENT: PAIN_FUNCTIONAL_ASSESSMENT: 0-10

## 2023-02-16 NOTE — PROGRESS NOTES
Ambulatory Surgery/Procedure Discharge Note    Vitals:    02/16/23 0911   BP: 127/67   Pulse: 71   Resp: 14   Temp:    SpO2: 99%       No intake/output data recorded. Intake: 240 ml  Output: 200ml    Restroom use offered before discharge. Yes    Pain assessment:  none  Pain Level: 0    Pt states readiness to discharge home. Dr. Montse Gil at bedside in recovery. No pain or nausea. Pt tolerating PO. Pt to vehicle via wheelchair with , Gisela Mcallisterow. Patient discharged to home/self care.  Patient discharged via wheel chair by transporter to waiting family/S.O.       2/16/2023 10:06 AM

## 2023-02-16 NOTE — H&P
Pre-operative History and Physical    Patient: Hannah Jaramillo  : 1940  Acct#:     History Obtained From:  patient    HISTORY OF PRESENT ILLNESS:    The patient is a 80 y.o. female  who presents with previous adenomatous polyp, change in bowel habits, dyspepsia/GERD    Past Medical History:        Diagnosis Date    Asthma     CAD (coronary artery disease)      Past Surgical History:        Procedure Laterality Date    HYSTERECTOMY (CERVIX STATUS UNKNOWN)      PACEMAKER INSERTION       Medications Prior to Admission:   No current facility-administered medications on file prior to encounter. Current Outpatient Medications on File Prior to Encounter   Medication Sig Dispense Refill    pantoprazole (PROTONIX) 40 MG tablet PANTOPRAZOLE SODIUM 40 MG TBEC      fluticasone (FLONASE) 50 MCG/ACT nasal spray 2 sprays by Nasal route daily      Cyanocobalamin (VITAMIN B-12 PO) Take by mouth      aspirin 81 MG chewable tablet Take 81 mg by mouth daily      albuterol sulfate HFA (PROVENTIL;VENTOLIN;PROAIR) 108 (90 Base) MCG/ACT inhaler       dilTIAZem (CARDIZEM CD) 120 MG extended release capsule       furosemide (LASIX) 20 MG tablet       hydroCHLOROthiazide (MICROZIDE) 12.5 MG capsule       KLOR-CON M20 20 MEQ extended release tablet       Cholecalciferol (VITAMIN D3) 2000 UNITS CAPS Take 1 tablet by mouth daily. montelukast (SINGULAIR) 10 MG tablet Take 10 mg by mouth nightly. Allergies:   Avelox [moxifloxacin], Azithromycin, Bee venom, Beta adrenergic blockers, Cefprozil, Ciprofloxacin, Declomycin [demeclocycline], Doxycycline, Erythromycin, Esomeprazole, Gabapentin, Morphine, Norvasc [amlodipine], Penicillins, Pregabalin, Pyridoxine, Ranitidine, Spiriva respimat [tiotropium bromide monohydrate], Sulfa antibiotics, and Sulfadiazine    History of allergic reaction to anesthesia:  No    PHYSICAL EXAM:      BP (!) 117/48   Pulse 73   Temp 97.6 °F (36.4 °C) (Temporal)   Resp 16   Ht 5' 3\" (1.6 m)   Wt 201 lb (91.2 kg)   SpO2 100%   BMI 35.61 kg/m²  I        Heart:  Normal apical impulse, regular rate and rhythm, normal S1 and S2, no S3 or S4, and no murmur noted    Lungs:  No increased work of breathing, good air exchange, clear to auscultation bilaterally, no crackles or wheezing    Abdomen:  No scars, normal bowel sounds, soft, non-distended, non-tender, no masses palpated, no hepatosplenomegally      ASA Grade:  ASA 3 - Patient with moderate systemic disease with functional limitations      ASSESSMENT AND PLAN:    1. Patient is a 80 y.o. female here for colonoscopy with deep sedation  2. Procedure options, risks and benefits reviewed with patient. Patient expresses understanding.             Evelia Guzman MD  [facility]  [phone]

## 2023-02-16 NOTE — ANESTHESIA POSTPROCEDURE EVALUATION
Department of Anesthesiology  Postprocedure Note    Patient: Gabriela Mayo  MRN: [id number]  YOB: 1940  Date of evaluation: 2/16/2023      Procedure Summary     Date: 02/16/23 Room / Location: [facility]    Anesthesia Start: 9654 Anesthesia Stop: 9754    Procedures:       EGD BIOPSY      COLONOSCOPY POLYPECTOMY SNARE/COLD BIOPSY Diagnosis:       Gastroesophageal reflux disease, unspecified whether esophagitis present      Generalized abdominal pain      (Gastroesophageal reflux disease, unspecified whether esophagitis present [K21. 9]Generalized abdominal pain [R10.84])    Surgeons: Parris Pepper MD Responsible Provider: Sharmin Browne MD    Anesthesia Type: MAC ASA Status: 4          Anesthesia Type: No value filed.     Baljinder Phase I: Baljinder Score: 10    Baljinder Phase II: Baljinder Score: 9      Anesthesia Post Evaluation    Patient location during evaluation: PACU  Patient participation: complete - patient participated  Level of consciousness: awake and alert  Airway patency: patent  Nausea & Vomiting: no nausea and no vomiting  Complications: no  Cardiovascular status: hemodynamically stable  Respiratory status: acceptable  Hydration status: euvolemic  Multimodal analgesia pain management approach

## 2023-02-16 NOTE — PROCEDURES
EGD/COLONOSCOPY PROCEDURE NOTE:        Patient: Gabriela Mayo  : 1940  Acct#:     Procedure:   Esophagogastroduodenoscopy with biopsy  Colonoscopy with biopsy, polypectomy (cold snare)        Date:  2023     Surgeon:  Christi Vegas MD,     Referring Physician:  Justine Rivera      Preoperative Diagnosis:    History of colon polyps, change in bowel habits  History of dyspepsia/GERD      Anesthesia: MAC anesthesia      Consent:  The patient or their legal guardian has signed an informed consent, and is aware of the potential risks, benefits, alternatives, and potential complications of this procedure. These include, but are not limited to hemorrhage, bleeding, post procedural pain, perforation, phlebitis, aspiration, hypotension, hypoxia, cardiovascular events such as arryhthmia, and possibly death. EGD PROCEDURE NOTE        Description of Procedure: The patient was then taken to the endoscopy suite, placed in the left lateral decubitus position and the above IV sedation was administrered. The Olympus video endoscope was placed through the patient's oropharynx without difficulty to the extent of the 2nd portion of the duodenum. The patient tolerated the procedure well and was taken to the post anesthesia care unit in good condition. Complications: None  EBL: none      Findings:  Esophagus:  Visualization of the esophagus demonstrated normal mucosa. 1 cm small sliding hiatal hernia seen. Stomach: The scope was then advanced into the stomach. Both forward and retroflexed views of the stomach were obtained. Visualization of the gastric body and antrum demonstrated mild erythema biopsies obtained. A retroflexed exam of the gastric cardia and fundus demonstrated normal mucosa. The pylorus was patent and the scope was advanced into the duodenum. Duodenum: Visualization of the duodenal bulb and the second portion of the duodenum demonstrated normal mucosa.   The scope was then withdrawn back into the stomach, it was decompressed, and the scope was completely withdrawn. EGD Impression:    Mild antral gastritis            COLONOSCOPY PROCEDURE NOTE          Procedure description:   An informed consent was obtained from the patient after explanation of indications, benefits, possible risks and complications of the procedure. The patient was then taken to the endoscopy suite, placed in the left lateral decubitus position, and the above IV anesthesia was administered. A digital rectal examination was performed and revealed negative without mass, lesions or tenderness. The Olympus video colonoscope was placed in the patient's rectum under digital direction and advanced to the ileocolonic anastomosis site. The prep was good. Careful circumferential examination of the mucosa was performed. The scope was then withdrawn into the rectum and retroflexed. The scope was straightened, the colon was decompressed and the scope was withdrawn from the patient. The patient tolerated the procedure well and was taken to the recovery room in good condition. Complications: none  EBL: None    Findings:  2 polyps in the transverse colon, 5 to 6 mm in size, removed by cold snare polypectomy. Mild left-sided diverticulosis. The mucosa otherwise appeared unremarkable throughout the exam up to the ileocolonic anastomosis site. Random biopsies were obtained. Colonoscopy Impression:    2 polyps in the transverse colon, 5 to 6 mm in size, removed by cold snare polypectomy. Mild left-sided diverticulosis. The mucosa otherwise appeared unremarkable throughout the exam up to the ileocolonic anastomosis site. Random biopsies were obtained. Recommendations:     Follow-up pathology results  Recommend trial of low FODMAP diet  Follow-up with us in the office in 3 to 4 weeks  No further surveillance colonoscopy recommended due to her age                MD [facility]  [phone]    2/16/2023

## 2023-02-16 NOTE — ANESTHESIA PRE PROCEDURE
Department of Anesthesiology  Preprocedure Note       Name:  Eddi Nagy   Age:  80 y.o.  :  1940                                          MRN:  [id number]         Date:  2023      Surgeon: Jennifer Alvarado):  Carolina Rousseau MD    Procedure: Procedure(s):  ESOPHAGOGASTRODUODENOSCOPY  COLONOSCOPY    Medications prior to admission:   Prior to Admission medications    Medication Sig Start Date End Date Taking? Authorizing Provider   pantoprazole (PROTONIX) 40 MG tablet PANTOPRAZOLE SODIUM 40 MG TBEC 18  Yes Historical Provider, MD   aspirin 81 MG chewable tablet Take 81 mg by mouth daily    Historical Provider, MD   albuterol sulfate HFA (PROVENTIL;VENTOLIN;PROAIR) 108 (90 Base) MCG/ACT inhaler  23   Historical Provider, MD   dilTIAZem (CARDIZEM CD) 120 MG extended release capsule  23   Historical Provider, MD   furosemide (LASIX) 20 MG tablet  22   Historical Provider, MD   hydroCHLOROthiazide (MICROZIDE) 12.5 MG capsule  22   Historical Provider, MD   budesonide (PULMICORT FLEXHALER) 180 MCG/ACT AEPB inhaler Inhale 2 puffs into the lungs 2 times daily. Historical Provider, MD   Cholecalciferol (VITAMIN D3) 2000 UNITS CAPS Take 1 tablet by mouth daily. Historical Provider, MD   montelukast (SINGULAIR) 10 MG tablet Take 10 mg by mouth nightly. Historical Provider, MD   lisinopril (PRINIVIL;ZESTRIL) 5 MG tablet Take 5 mg by mouth daily. Historical Provider, MD       Current medications:    Current Facility-Administered Medications   Medication Dose Route Frequency Provider Last Rate Last Admin    lactated ringers IV soln infusion   IntraVENous Continuous Fatimah Wong MD           Allergies:     Allergies   Allergen Reactions    Avelox [Moxifloxacin]     Azithromycin     Cefprozil Nausea Only    Doxycycline     Erythromycin     Morphine Nausea Only    Penicillins     Sulfa Antibiotics        Problem List:  There is no problem list on file for this patient. Past Medical History:        Diagnosis Date    Asthma     CAD (coronary artery disease)        Past Surgical History:        Procedure Laterality Date    HYSTERECTOMY (CERVIX STATUS UNKNOWN)      PACEMAKER INSERTION         Social History:    Social History     Tobacco Use    Smoking status: Never    Smokeless tobacco: Not on file   Substance Use Topics    Alcohol use: No                                Counseling given: Not Answered      Vital Signs (Current):   Vitals:    02/16/23 0741   BP: (!) 133/45   Pulse: 74   Resp: 16   Temp: 97.5 °F (36.4 °C)   TempSrc: Temporal   SpO2: 96%   Weight: 201 lb (91.2 kg)   Height: 5' 3\" (1.6 m)                                              BP Readings from Last 3 Encounters:   02/16/23 (!) 133/45       NPO Status: Time of last liquid consumption: 0500                        Time of last solid consumption: 0900                        Date of last liquid consumption: 02/16/23                        Date of last solid food consumption: 02/15/23    BMI:   Wt Readings from Last 3 Encounters:   02/16/23 201 lb (91.2 kg)     Body mass index is 35.61 kg/m².     CBC:   Lab Results   Component Value Date/Time    WBC 12.7 03/17/2013 10:52 PM    RBC 3.45 03/17/2013 10:52 PM    HGB 11.3 03/17/2013 10:52 PM    HCT 34.0 03/17/2013 10:52 PM    MCV 98.6 03/17/2013 10:52 PM    RDW 13.8 03/17/2013 10:52 PM     03/17/2013 10:52 PM       CMP:   Lab Results   Component Value Date/Time     03/17/2013 10:52 PM    K 3.6 03/17/2013 10:52 PM     03/17/2013 10:52 PM    CO2 30 03/17/2013 10:52 PM    BUN 12 03/17/2013 10:52 PM    CREATININE 0.6 03/17/2013 10:52 PM    GFRAA >60 03/17/2013 10:52 PM    AGRATIO 1.6 03/17/2013 10:52 PM    GLUCOSE 109 03/17/2013 10:52 PM    PROT 7.5 03/17/2013 10:52 PM    CALCIUM 9.4 03/17/2013 10:52 PM    BILITOT 0.50 03/17/2013 10:52 PM    ALKPHOS 112 03/17/2013 10:52 PM    AST 21 03/17/2013 10:52 PM    ALT 19 03/17/2013 10:52 PM       POC Tests: No results for input(s): POCGLU, POCNA, POCK, POCCL, POCBUN, POCHEMO, POCHCT in the last 72 hours. Coags:   Lab Results   Component Value Date/Time    PROTIME 10.3 06/01/2010 01:29 PM    INR 0.94 06/01/2010 01:29 PM    APTT 28.6 06/01/2010 01:29 PM       HCG (If Applicable): No results found for: PREGTESTUR, PREGSERUM, HCG, HCGQUANT     ABGs: No results found for: PHART, PO2ART, DDJ9LCL, YJD6JPG, BEART, [id number]     Type & Screen (If Applicable):  No results found for: LABABO, LABRH    Drug/Infectious Status (If Applicable):  No results found for: HIV, HEPCAB    COVID-19 Screening (If Applicable): No results found for: COVID19        Anesthesia Evaluation  Patient summary reviewed and Nursing notes reviewed no history of anesthetic complications:   Airway: Mallampati: II  TM distance: >3 FB   Neck ROM: full  Mouth opening: > = 3 FB   Dental:    (+) upper dentures and poor dentition      Pulmonary:normal exam    (+) sleep apnea: on CPAP,  asthma:                            Cardiovascular:    (+) hypertension:, pacemaker: AICD and pacemaker, CAD:,                   Neuro/Psych:   Negative Neuro/Psych ROS              GI/Hepatic/Renal:   (+) morbid obesity          Endo/Other: Negative Endo/Other ROS                    Abdominal:             Vascular: negative vascular ROS. Other Findings:           Anesthesia Plan      MAC     ASA 4       Induction: intravenous. MIPS: Prophylactic antiemetics administered. Anesthetic plan and risks discussed with patient. Plan discussed with CRNA.     Attending anesthesiologist reviewed and agrees with Preprocedure content                Delphine Nguyen MD   2/16/2023

## 2023-02-16 NOTE — DISCHARGE INSTRUCTIONS
ENDOSCOPY DISCHARGE INSTRUCTIONS:    Call the physician that did your procedure for any questions or concern:    [facility]: [phone]  DR. MUELLER [facility] [place]        ACTIVITY:    There are potential side effects to the medications used for sedation and anesthesia during your procedure. These include:  Dizziness or light-headedness, confusion or memory loss, delayed reaction times, loss of coordination, nausea and vomiting. Because of your increased risk for injury, we ask that you observe the following precautions: For the next 24 hours,  DO NOT operate an automobile, bicycle, motorcycle, , power tools or large equipment of any kind. Do not drink alcohol, sign any legal documents or make any legal decisions for 24 hours. Do not bend your head over lower than your heart. DO sit on the side of bed/couch awhile before getting up. Plan on bedrest or quiet relaxation today. You may resume normal activities in 24 hours. DIET:    Your first meal today should be light, avoiding spicy and fatty foods. If you tolerate this first meal, then you may advance to your regular diet unless otherwise advised by your physician. NORMAL SYMPTOMS:  -Mild sore throat if youve had an EGD   -Gaseous discomfort    NOTIFY YOUR PHYSICIAN IF THESE SYMPTOMS OCCUR:  1. Fever (greater than 100)  5. Increased abdominal bloating  2. Severe pain    6. Excessive bleeding  3. Nausea and vomiting  7. Chest pain                                                                    4. Chills    8. Shortness of breath    ADDITIONAL INSTRUCTIONS:    Biopsy results: Call [phone],[facility] for biopsy results in 1 week    Educational Information:    Findings:  2 polyps in the transverse colon, 5 to 6 mm in size, removed by cold snare polypectomy. Mild left-sided diverticulosis. The mucosa otherwise appeared unremarkable throughout the exam up to the ileocolonic anastomosis site. Random biopsies were obtained.            Colonoscopy Impression:    2 polyps in the transverse colon, 5 to 6 mm in size, removed by cold snare polypectomy. Mild left-sided diverticulosis. The mucosa otherwise appeared unremarkable throughout the exam up to the ileocolonic anastomosis site. Random biopsies were obtained. Recommendations: Follow-up pathology results  Recommend trial of low FODMAP diet  Follow-up with us in the office in 3 to 4 weeks  No further surveillance colonoscopy recommended due to her age      Please review these discharge instructions this evening or tomorrow for  information you may have forgotten. We want to thank you for choosing the [facility] as your health care provider. We always strive to provide you with excellent care while you are here. You may receive a survey in the mail regarding your care. We would appreciate you taking a few minutes of your time to complete this survey. Colon Polyps: Care Instructions  Your Care Instructions     Colon polyps are growths in the colon or the rectum. The cause of most colon polyps is not known, and most people who get them do not have any problems. But a certain kind can turn into cancer. For this reason, regular testing for colon polyps is important for people as they get older. It is also important for anyone who has an increased risk for colon cancer. Polyps are usually found through routine colon cancer screening tests. Although most colon polyps are not cancerous, they are usually removed and then tested for cancer. Screening for colon cancer saves lives because the cancer can usually be cured if it is caught early. If you have a polyp that is the type that can turn into cancer, you may need more tests to examine your entire colon. The doctor will remove any other polyps that are found, and you will be tested more often. Follow-up care is a key part of your treatment and safety.  Be sure to make and go to all appointments, and call your doctor if you are having problems. It's also a good idea to know your test results and keep a list of the medicines you take. How can you care for yourself at home? Regular exams to look for colon polyps are the best way to prevent polyps from turning into colon cancer. These can include stool tests, sigmoidoscopy, colonoscopy, and CT colonography. Talk with your doctor about a testing schedule that is right for you. To prevent polyps  There is no home treatment that can prevent colon polyps. But these steps may help lower your risk for cancer. Stay active. Being active can help you get to and stay at a healthy weight. Try to exercise on most days of the week. Walking is a good choice. Eat well. Choose a variety of vegetables, fruits, legumes (such as peas and beans), fish, poultry, and whole grains. Do not smoke. If you need help quitting, talk to your doctor about stop-smoking programs and medicines. These can increase your chances of quitting for good. If you drink alcohol, limit how much you drink. Limit alcohol to 2 drinks a day for men and 1 drink a day for women. When should you call for help? Call your doctor now or seek immediate medical care if:    You have severe belly pain. Your stools are maroon or very bloody. Watch closely for changes in your health, and be sure to contact your doctor if:    You have a fever. You have nausea or vomiting. You have a change in bowel habits (new constipation or diarrhea). Your symptoms get worse or are not improving as expected. Where can you learn more? Go to http://www.woods.com/ and enter C571 to learn more about \"Colon Polyps: Care Instructions. \"  Current as of: June 6, 2022               Content Version: 13.5  © 3630-8132 [employer]. Care instructions adapted under license by [address]  If you have questions about a medical condition or this instruction, always ask your healthcare professional. [employer] disclaims any warranty or liability for your use of this information.

## (undated) DEVICE — FORCEPS BX L240CM JAW DIA2.8MM L CAP W/ NDL MIC MESH TOOTH

## (undated) DEVICE — CANNULA SAMP CO2 AD GRN 7FT CO2 AND 7FT O2 TBNG UNIV CONN

## (undated) DEVICE — TRAP SPEC RETRV CLR PLAS POLYP IN LN SUCT QUIK CTCH

## (undated) DEVICE — SNARES COLD OVAL 10MM THIN